# Patient Record
Sex: MALE | Race: WHITE | URBAN - METROPOLITAN AREA
[De-identification: names, ages, dates, MRNs, and addresses within clinical notes are randomized per-mention and may not be internally consistent; named-entity substitution may affect disease eponyms.]

---

## 2020-11-29 ENCOUNTER — OFFICE VISIT (OUTPATIENT)
Dept: FAMILY MEDICINE CLINIC | Facility: CLINIC | Age: 30
End: 2020-11-29
Payer: OTHER GOVERNMENT

## 2020-11-29 VITALS
HEIGHT: 71 IN | TEMPERATURE: 99 F | DIASTOLIC BLOOD PRESSURE: 74 MMHG | SYSTOLIC BLOOD PRESSURE: 134 MMHG | WEIGHT: 190 LBS | HEART RATE: 78 BPM | OXYGEN SATURATION: 98 % | BODY MASS INDEX: 26.6 KG/M2

## 2020-11-29 DIAGNOSIS — Z20.822 ENCOUNTER FOR SCREENING LABORATORY TESTING FOR COVID-19 VIRUS: Primary | ICD-10-CM

## 2020-11-29 DIAGNOSIS — R43.2 LOSS OF TASTE: ICD-10-CM

## 2020-11-29 PROCEDURE — 3008F BODY MASS INDEX DOCD: CPT | Performed by: PHYSICIAN ASSISTANT

## 2020-11-29 PROCEDURE — 3075F SYST BP GE 130 - 139MM HG: CPT | Performed by: PHYSICIAN ASSISTANT

## 2020-11-29 PROCEDURE — 3078F DIAST BP <80 MM HG: CPT | Performed by: PHYSICIAN ASSISTANT

## 2020-11-29 PROCEDURE — 99202 OFFICE O/P NEW SF 15 MIN: CPT | Performed by: PHYSICIAN ASSISTANT

## 2020-11-29 NOTE — PROGRESS NOTES
Patient presents with:  Covid: Entered by patient      HPI:   Maged Hampton is a 31year old male who presents for COVID concerns. Patient had no known exposure to COVID.   Symptoms have progressed into loss of taste/smell, mild nasal congestion, no cou covid-19 virus  (primary encounter diagnosis)  Loss of taste    PLAN   Maintain fluid intake for continued hydration. Nasal saline to keep nasal passages moist and promote sinus drainage. Tylenol prn fever/body aches.  If exposed in close contact to [place],

## 2020-11-29 NOTE — PATIENT INSTRUCTIONS
Coronavirus Disease 2019 (COVID-19): Overview  Coronavirus disease 2019 (COVID-19) is a respiratory illness. It's caused by a new (novel) coronavirus. There are many types of coronavirus. Coronaviruses are a very common cause of colds and bronchitis.  The · New loss of sense of smell or taste  You can check your symptoms with the CDC’s Coronavirus Self-. What are possible complications from [id number]? In many cases, this virus can cause infection (pneumonia) in both lungs.  In some cases, this can ca Your healthcare provider will ask about your symptoms. He or she will ask where you live, and about your recent travel, and any contact with sick people.  If your healthcare provider thinks you may have COVID-19, he or she will consider whether to test you · Imaging tests. You may have a chest X-ray or CT scan. Note about reinfection and your immunity  At this time, it's unclear if people can be reinfected with COVID-19.  The CDC notes that if a person has fully recovered from COVID-19 and is retested [facility] · Prone positioning. Depending on how sick you are during your hospital stay, your healthcare team may turn you regularly on your stomach. This is called prone positioning. It helps increase the amount of oxygen you get to your lungs.  Follow your healthca You are at risk for COVID-19 if you have had close contact with someone with the virus, or if you live in or traveled to an area with cases of it. Close contact means being within 6 feet of a person known to have COVID-19 for a total of 15 minutes or more.

## 2020-12-05 ENCOUNTER — DOCUMENTATION ONLY (OUTPATIENT)
Dept: FAMILY MEDICINE CLINIC | Facility: CLINIC | Age: 30
End: 2020-12-05